# Patient Record
Sex: MALE | Race: ASIAN | URBAN - METROPOLITAN AREA
[De-identification: names, ages, dates, MRNs, and addresses within clinical notes are randomized per-mention and may not be internally consistent; named-entity substitution may affect disease eponyms.]

---

## 2017-01-25 ENCOUNTER — OFFICE VISIT (OUTPATIENT)
Dept: FAMILY MEDICINE | Facility: CLINIC | Age: 47
End: 2017-01-25
Payer: COMMERCIAL

## 2017-01-25 VITALS
TEMPERATURE: 97.1 F | BODY MASS INDEX: 22.5 KG/M2 | DIASTOLIC BLOOD PRESSURE: 79 MMHG | WEIGHT: 140 LBS | HEART RATE: 75 BPM | SYSTOLIC BLOOD PRESSURE: 117 MMHG | OXYGEN SATURATION: 100 % | HEIGHT: 66 IN

## 2017-01-25 DIAGNOSIS — K64.5 THROMBOSED EXTERNAL HEMORRHOIDS: Primary | ICD-10-CM

## 2017-01-25 PROCEDURE — 99213 OFFICE O/P EST LOW 20 MIN: CPT | Performed by: PREVENTIVE MEDICINE

## 2017-01-25 ASSESSMENT — PAIN SCALES - GENERAL: PAINLEVEL: NO PAIN (0)

## 2017-01-25 NOTE — NURSING NOTE
"Chief Complaint   Patient presents with     Rectal Problem       Initial /79 mmHg  Pulse 75  Temp(Src) 97.1  F (36.2  C) (Oral)  Ht 5' 5.5\" (1.664 m)  Wt 140 lb (63.504 kg)  BMI 22.93 kg/m2  SpO2 100% Estimated body mass index is 22.93 kg/(m^2) as calculated from the following:    Height as of this encounter: 5' 5.5\" (1.664 m).    Weight as of this encounter: 140 lb (63.504 kg).  BP completed using cuff size: nohemy Heller MA        "

## 2017-01-25 NOTE — PROGRESS NOTES
"  SUBJECTIVE:                                                    Cata Cruz is a 46 year old male who presents to clinic today for the following health issues:      Hemorrhoids      Duration: x 2 years    Description:   Pain: YES  Itching: YES    Accompanying signs and symptoms:   Blood in stool: YES  Changes in stool pattern: no     History (similar episodes/previous evaluation): None    Precipitating or alleviating factors: None    Therapies tried and outcome: anusol     Feels worsening of symptoms over the last few days. Feels prolapse in his rectum. Bleeding+. Has been using [employer] hemorrhoid cream with some improvement.   No fever, or chills, no abdominal pain.  Was referred to General Surgery about a year ago, had not followed up since symptoms had improved.    Problem list and histories reviewed & adjusted, as indicated.  Additional history: as documented    Patient Active Problem List   Diagnosis     CARDIOVASCULAR SCREENING; LDL GOAL LESS THAN 160     History reviewed. No pertinent past surgical history.    Social History   Substance Use Topics     Smoking status: Never Smoker      Smokeless tobacco: Never Used     Alcohol Use: No     Family History   Problem Relation Age of Onset     DIABETES Mother      Hyperlipidemia Mother          Current Outpatient Prescriptions   Medication Sig Dispense Refill     ANUCORT-HC 25 MG suppository INSERT ONE SUPPOSITORY RECTALLY TWICE A DAY 20 suppository 0     No Known Allergies  BP Readings from Last 3 Encounters:   01/25/17 117/79   01/02/15 120/78    Wt Readings from Last 3 Encounters:   01/25/17 140 lb (63.504 kg)   01/02/15 146 lb (66.225 kg)            ROS:  Constitutional, HEENT, cardiovascular, pulmonary, gi and gu systems are negative, except as otherwise noted.    OBJECTIVE:                                                    /79 mmHg  Pulse 75  Temp(Src) 97.1  F (36.2  C) (Oral)  Ht 5' 5.5\" (1.664 m)  Wt 140 lb (63.504 kg)  BMI 22.93 kg/m2  " SpO2 100%  Body mass index is 22.93 kg/(m^2).  GENERAL APPEARANCE: healthy, alert and no distress  EYES: Eyes grossly normal to inspection and conjunctivae and sclerae normal  NECK: no adenopathy and no asymmetry, masses, or scars  RESP: lungs clear to auscultation - no rales, rhonchi or wheezes  CV: regular rates and rhythm and normal S1 S2, no S3 or S4  ABDOMEN: soft, non-tender  SKIN: no suspicious lesions or rashes  NEURO: Normal strength and tone, mentation intact and speech normal  PSYCH: mentation appears normal  Rectum: Thrombosed external hemorrhoid+, some ulceration and bleeding.    Diagnostic test results:  Diagnostic Test Results:  No results found for this or any previous visit (from the past 24 hour(s)).     ASSESSMENT/PLAN:                                                    1. Thrombosed external hemorrhoids  -ER precautions reviewed  -Jayden martinez  - GENERAL SURG ADULT REFERRAL      Follow up with Provider - Appointment scheduled with general surgery tomorrow at 830      Arcelia Waters MD MPH    [facility]

## 2017-01-25 NOTE — MR AVS SNAPSHOT
"              After Visit Summary   1/25/2017    Cata Cruz    MRN: [id number]           Patient Information     Date Of Birth          1970        Visit Information        Provider Department      1/25/2017 4:20 PM Arcelia Waters MD [facility]        Today's Diagnoses     Need for prophylactic vaccination and inoculation against influenza    -  1        Follow-ups after your visit        Your next 10 appointments already scheduled     Jan 26, 2017  8:30 AM   New Visit with Kody Mejia,    [facility] ([facility])    [address] [place] [address] [id number] [phone]              Who to contact     If you have questions or need follow up information about today's clinic visit or your schedule please contact [facility] directly at [phone].  Normal or non-critical lab and imaging results will be communicated to you by MyChart, letter or phone within 4 business days after the clinic has received the results. If you do not hear from us within 7 days, please contact the clinic through MyChart or phone. If you have a critical or abnormal lab result, we will notify you by phone as soon as possible.  Submit refill requests through [employer] or call your pharmacy and they will forward the refill request to us. Please allow 3 business days for your refill to be completed.          Additional Information About Your Visit        MyChart Information     [employer] lets you send messages to your doctor, view your test results, renew your prescriptions, schedule appointments and more. To sign up, go to [email or web address] . Click on \"Log in\" on the left side of the screen, which will take you to the Welcome page. Then click on \"Sign up Now\" on the right side of the page.     You will be asked to enter the access code listed below, as well as some personal information. Please follow the directions to create " "your username and password.     Your access code is: [email or web address]  Expires: 2017  4:53 PM     Your access code will  in 90 days. If you need help or a new code, please call your [facility] or [phone].        Care EveryWhere ID     This is your Care EveryWhere ID. This could be used by other organizations to access your [facility] medical records  [id number]        Your Vitals Were     Pulse Temperature Height BMI (Body Mass Index) Pulse Oximetry       75 97.1  F (36.2  C) (Oral) 5' 5.5\" (1.664 m) 22.93 kg/m2 100%        Blood Pressure from Last 3 Encounters:   17 117/79   01/02/15 120/78    Weight from Last 3 Encounters:   17 140 lb (63.504 kg)   01/02/15 146 lb (66.225 kg)              Today, you had the following     No orders found for display       Primary Care Provider    None Specified       No primary provider on file.        Thank you!     Thank you for choosing [facility]  for your care. Our goal is always to provide you with excellent care. Hearing back from our patients is one way we can continue to improve our services. Please take a few minutes to complete the written survey that you may receive in the mail after your visit with us. Thank you!             Your Updated Medication List - Protect others around you: Learn how to safely use, store and throw away your medicines at www.disposemymeds.org.          This list is accurate as of: 17  4:53 PM.  Always use your most recent med list.                   Brand Name Dispense Instructions for use    ANUCORT-HC 25 MG Suppository   Generic drug:  hydrocortisone     20 suppository    INSERT ONE SUPPOSITORY RECTALLY TWICE A DAY         "

## 2017-01-26 ENCOUNTER — OFFICE VISIT (OUTPATIENT)
Dept: SURGERY | Facility: CLINIC | Age: 47
End: 2017-01-26
Payer: COMMERCIAL

## 2017-01-26 VITALS
DIASTOLIC BLOOD PRESSURE: 72 MMHG | HEIGHT: 63 IN | HEART RATE: 63 BPM | BODY MASS INDEX: 24.63 KG/M2 | SYSTOLIC BLOOD PRESSURE: 111 MMHG | WEIGHT: 139 LBS

## 2017-01-26 DIAGNOSIS — K64.8 INTERNAL PROLAPSED HEMORRHOIDS: Primary | ICD-10-CM

## 2017-01-26 PROCEDURE — 99203 OFFICE O/P NEW LOW 30 MIN: CPT | Performed by: SURGERY

## 2017-01-26 NOTE — Clinical Note
I had the pleasure of seeing Cata Cruz in the clinic for his hemorroids.   Thanks for allowing me the chance to participate in his care.  Sincerely,  Kody Mejia  General Surgery  Head and Neck/Endocrine Surgery

## 2017-01-26 NOTE — PROGRESS NOTES
"I was asked to see Cata Cruz regarding  pain and bleeding by PCP    HPI:  Patient is a 46 year old male  with complaints pain and bleeding  The symptoms started 1 year ago  Patient has not family history of colon cancer  He was prescribed annusol HC yesterday and it seems to make the episode better.      Review Of Systems  General: negative for fever or chills  Skin: negative except hemorrhoid as noted below  Ears/Nose/Throat: negative for, epistaxis, bleeding gums  Respiratory: No shortness of breath, dyspnea on exertion, cough, or hemoptysis  Cardiovascular: negative for and chest pain  Gastrointestinal: negative for, nausea, vomiting and abdominal pain. Positive as noted for hemoroid   Neurologic: negative for and local weakness  Hematologic/Lymphatic/Immunologic: negative for, bleeding disorder and frequent infections  Endocrine: negative for, diabetes, polydipsia and polyuria    /72 mmHg  Pulse 63  Ht 1.6 m (5' 3\")  Wt 63.05 kg (139 lb)  BMI 24.63 kg/m2      No past medical history on file.    No past surgical history on file.    Current Outpatient Prescriptions   Medication Sig Dispense Refill     ANUCORT-HC 25 MG suppository INSERT ONE SUPPOSITORY RECTALLY TWICE A DAY 20 suppository 0       Social History     Social History     Marital Status: Single     Spouse Name: N/A     Number of Children: N/A     Years of Education: N/A     Occupational History     Not on file.     Social History Main Topics     Smoking status: Never Smoker      Smokeless tobacco: Never Used     Alcohol Use: No     Drug Use: No     Sexual Activity:     Partners: Female     Other Topics Concern     Not on file     Social History Narrative       Physical exam:  Patient able to get up on table without difficulty.  Head eyes, nose and mouth within normal limits.  No supraclavicular or cervical adenopathy palpated.  Thyroid within normal limits.  No carotid bruits auscultated.  Lungs are clear to auscultation  Heart is " regular rate and rhythm with no murmur or thrills noted.  No costal vertebral angle tenderness noted.  Abdomen is abdomen is soft without significant tenderness, masses, organomegaly or guarding  bowel sounds are positive and no caput medusa noted.  No obvious hernias ventral hernia noted.  Easily palpable posterior tibial pulse or dorsalis pedis pulse bilaterally.  Lower extremity edema is not present.    Rectal exam:internal hemorrhoids noted on the right side    Chart reviewed    Assessment: internal hemorrhoids    Discussed hemorrhoid care including fiber supplements (metamucil and flax seed ground), not spending much time on the toilet.  Also discussed giving him a prescription for lidocaine and nifedipine cream  and how to use it for swelling and discomfort.  Discussed ways to keep are clean.  I also briefly discussed hemorrhoid surgery and the discomforts with it.  Encouraged patient to do conservative treatment first.      Kody Mejia D.O.

## 2017-01-26 NOTE — Clinical Note
I had the pleasure of seeing Cata Cruz in the clinic for his hemorrhoids. We are going to try conservative management first and he agrees with that plan.   Thanks for allowing me the chance to participate in his care.  Sincerely,  Kody Mejia  General Surgery  Head and Neck/Endocrine Surgery

## 2021-12-30 ENCOUNTER — E-VISIT (OUTPATIENT)
Dept: FAMILY MEDICINE | Facility: CLINIC | Age: 51
End: 2021-12-30
Payer: COMMERCIAL

## 2021-12-30 DIAGNOSIS — Z20.822 SUSPECTED COVID-19 VIRUS INFECTION: Primary | ICD-10-CM

## 2021-12-30 PROCEDURE — 99421 OL DIG E/M SVC 5-10 MIN: CPT | Performed by: NURSE PRACTITIONER

## 2021-12-30 NOTE — PATIENT INSTRUCTIONS
Cata,      Based on your responses, you may have coronavirus (COVID-19). This illness can cause fever, cough and trouble breathing. Many people get a mild case and get better on their own. Some people can get very sick.    Will I be tested for COVID-19?  We would like to test you for COVID-19 virus. I have placed orders for this test.     To schedule: go to your [facility] home page and scroll down to the section that says  You have an appointment that needs to be scheduled  and click the large green button that says  Schedule Now  and follow the steps to find the next available openings.    If you are unable to complete these [facility] scheduling steps, please call [phone] to schedule your testing.     Return to work/school/ guidance:  Please let your workplace manager and staffing office know when your isolation ends.       If you receive a positive COVID-19 test result, follow the guidance of the those who are giving you the results. Usually the return to work is 10 days from symptom onset or positive test date, (or in some cases 20 days if you are immunocompromised). If your symptoms started after your positive test, the 10 days should start when your symptoms started.   o If you work at [facility] [employer], you must also be cleared by Employee Occupational Health and Safety to return to work.      If you receive a negative COVID-19 test result and did not have a high risk exposure to someone with a known positive COVID-19 test, you can return to work once you're free of fever for 24 hours without fever-reducing medication and your symptoms are improving or resolved.    If you receive a negative COVID-19 test and had a high-risk exposure to someone who has tested positive for COVID-19 then you can return to work 14 days after your last contact with the positive individual. Follow quarantine guidance given by your doctor or public health officials.     Sign up for GetWell Loop:  We know it's scary to  hear that you might have COVID-19. We want to track your symptoms to make sure you're okay over the next 2 weeks. Please look for an email from [employer]--this is a free, online program that we'll use to keep in touch. To sign up, follow the link in the email you will receive. Learn more at [email or web address]    How can I take care of myself?  Over the counter medications may help with your symptoms like congestion, cough, chills, or fever.    There are not many effective prescription treatments for early COVID-19. Hydroxychloroquine, ivermectin, and azithromycin are not effective or recommended for COVID-19.    If your symptoms started in the last 10 days, you may be able to receive a treatment with monoclonal antibodies. This treatment can lower your risk of severe illness and going to the hospital. It is given through an IV or under your skin (subcutaneous) and must be given at an infusion center. You must be 12 or older, weight at least 88 pounds, and have a positive COVID-19 test.     If you would like to sign up to be considered to receive the monoclonal antibody medicine, please complete a participation form through the [facility] here: MNRAP ([email or web address].[email or web address].mn.us/diseases/coronavirus/mnrap.html). You may also call the [facility] COVID-19 Public Hotline at 1-434.722.2658 (open Mon-Fri: 9am-7pm and Sat: 10am-6pm).     Not all people who are eligible will receive the medicine, since supply is limited. You will be contacted in the next 1 to 2 business days only if you are selected. If you do not receive a call, you have not been selected to receive the medicine. If you have any questions about this medication, please contact your primary care provider. For more information, see [email or web address].mn.us/diseases/coronavirus/meds.pdf      Get lots of rest. Drink extra fluids (unless a doctor has told you not to)    Take Tylenol (acetaminophen) or ibuprofen for fever  or pain. If you have liver or kidney problems, ask your family doctor if it's okay to take Tylenol o ibuprofen    Take over the counter medications for your symptoms, as directed by your doctor. You may also talk to your pharmacist.      If you have other health problems (like cancer, heart failure, an organ transplant or severe kidney disease): Call your specialty clinic if you don't feel better in the next 2 days.    Know when to call 911. Emergency warning signs include:  o Trouble breathing or shortness of breath  o Pain or pressure in the chest that doesn't go away  o Feeling confused like you haven't felt before, or not being able to wake up  o Bluish-colored lips or face    Where can I get more information?    [facility] - About COVID-19: [email or web address]     CDC - What to Do If You're Sick:     www.cdc.gov/coronavirus/2019-ncov/about/steps-when-sick.html    CDC - Ending Home Isolation:  https://www.cdc.gov/coronavirus/2019-ncov/your-health/quarantine-isolation.html    CDC - Caring for Someone:  www.cdc.gov/coronavirus/2019-ncov/if-you-are-sick/care-for-someone.html    [facility] clinical trials (COVID-19 research studies): clinicalaffairs.[facility]-clinical-trials    Below are the COVID-19 hotlines at the [facility] of Health ([facility]). Interpreters are available.  o For health questions: Call 518-533-6061 or 1-531.193.4942 (7 a.m. to 7 p.m.)  o For questions about schools and childcare: Call 175-510-6521 or 1-257.772.7056 (7 a.m. to 7 p.m.)  December 30, 2021  RE:  Cata Cruz                                                                                                                  [address]  [place] [facility] [address]      To whom it may concern:    I evaluated Cata Cruz on December 30, 2021. Cata Cruz should be excused from work/school.     They should let their workplace manager and staffing office know when their  quarantine ends.    We can not give an exact date as it depends on the information below. They can calculate this on their own or work with their manager/staffing office to calculate this. (For example if they were exposed on 10/04, they would have to quarantine for 14 full days. That would be through 10/18. They could return on 10/19.)    Quarantine Guidelines:    If patient receives a positive COVID-19 test result, they should follow the guidance of those who are giving the results. Usually the return to work is 10 (or in some cases 20 days from symptom onset.) If they work at [employer], they must be cleared by Employee Occupational Health and Safety to return to work.      If patient receives a negative COVID-19 test result and did not have a high risk exposure to someone with a known positive COVID-19 test, they can return to work once they're free of fever for 24 hours without fever-reducing medication and their symptoms are improving or resolved.    If patient receives a negative COVID-19 test and if they had a high risk exposure to someone who has tested positive for COVID-19 then they can return to work 14 days after their last contact with the positive individual    Note: If there is ongoing exposure to the covid positive person, this quarantine period may be longer than 14 days. (For example, if they are continually exposed to their child, who tested positive and cannot isolate from them, then the quarantine of 7-14 days can't start until their child is no longer contagious. This is typically 10 days from onset to the child's symptoms. So the total duration may be 17-24 days in this case.)     Sincerely,  Valeria Connelly CNP          o

## 2022-01-02 ENCOUNTER — HEALTH MAINTENANCE LETTER (OUTPATIENT)
Age: 52
End: 2022-01-02

## 2022-11-19 ENCOUNTER — HEALTH MAINTENANCE LETTER (OUTPATIENT)
Age: 52
End: 2022-11-19

## 2023-04-08 ENCOUNTER — HEALTH MAINTENANCE LETTER (OUTPATIENT)
Age: 53
End: 2023-04-08

## 2024-06-15 ENCOUNTER — HEALTH MAINTENANCE LETTER (OUTPATIENT)
Age: 54
End: 2024-06-15
